# Patient Record
Sex: FEMALE | Race: WHITE | NOT HISPANIC OR LATINO
[De-identification: names, ages, dates, MRNs, and addresses within clinical notes are randomized per-mention and may not be internally consistent; named-entity substitution may affect disease eponyms.]

---

## 2017-08-25 ENCOUNTER — APPOINTMENT (OUTPATIENT)
Dept: MAMMOGRAPHY | Facility: CLINIC | Age: 62
End: 2017-08-25
Payer: COMMERCIAL

## 2017-08-25 ENCOUNTER — OUTPATIENT (OUTPATIENT)
Dept: OUTPATIENT SERVICES | Facility: HOSPITAL | Age: 62
LOS: 1 days | End: 2017-08-25
Payer: COMMERCIAL

## 2017-08-25 DIAGNOSIS — Z00.8 ENCOUNTER FOR OTHER GENERAL EXAMINATION: ICD-10-CM

## 2017-08-25 PROCEDURE — 77067 SCR MAMMO BI INCL CAD: CPT

## 2017-08-25 PROCEDURE — 77063 BREAST TOMOSYNTHESIS BI: CPT | Mod: 26

## 2017-08-25 PROCEDURE — 77063 BREAST TOMOSYNTHESIS BI: CPT

## 2017-08-25 PROCEDURE — G0202: CPT | Mod: 26

## 2018-03-15 ENCOUNTER — APPOINTMENT (OUTPATIENT)
Dept: DERMATOLOGY | Facility: CLINIC | Age: 63
End: 2018-03-15

## 2018-08-28 ENCOUNTER — APPOINTMENT (OUTPATIENT)
Dept: MAMMOGRAPHY | Facility: CLINIC | Age: 63
End: 2018-08-28
Payer: COMMERCIAL

## 2018-08-28 ENCOUNTER — OUTPATIENT (OUTPATIENT)
Dept: OUTPATIENT SERVICES | Facility: HOSPITAL | Age: 63
LOS: 1 days | End: 2018-08-28
Payer: COMMERCIAL

## 2018-08-28 DIAGNOSIS — Z12.31 ENCOUNTER FOR SCREENING MAMMOGRAM FOR MALIGNANT NEOPLASM OF BREAST: ICD-10-CM

## 2018-08-28 PROCEDURE — 77067 SCR MAMMO BI INCL CAD: CPT

## 2018-08-28 PROCEDURE — 77067 SCR MAMMO BI INCL CAD: CPT | Mod: 26

## 2018-08-28 PROCEDURE — 77063 BREAST TOMOSYNTHESIS BI: CPT

## 2018-08-28 PROCEDURE — 77063 BREAST TOMOSYNTHESIS BI: CPT | Mod: 26

## 2018-09-09 ENCOUNTER — TRANSCRIPTION ENCOUNTER (OUTPATIENT)
Age: 63
End: 2018-09-09

## 2019-09-05 ENCOUNTER — APPOINTMENT (OUTPATIENT)
Dept: MAMMOGRAPHY | Facility: CLINIC | Age: 64
End: 2019-09-05
Payer: COMMERCIAL

## 2019-09-05 ENCOUNTER — OUTPATIENT (OUTPATIENT)
Dept: OUTPATIENT SERVICES | Facility: HOSPITAL | Age: 64
LOS: 1 days | End: 2019-09-05
Payer: COMMERCIAL

## 2019-09-05 DIAGNOSIS — Z12.31 ENCOUNTER FOR SCREENING MAMMOGRAM FOR MALIGNANT NEOPLASM OF BREAST: ICD-10-CM

## 2019-09-05 PROCEDURE — 77063 BREAST TOMOSYNTHESIS BI: CPT | Mod: 26

## 2019-09-05 PROCEDURE — 77063 BREAST TOMOSYNTHESIS BI: CPT

## 2019-09-05 PROCEDURE — 77067 SCR MAMMO BI INCL CAD: CPT

## 2019-09-05 PROCEDURE — 77067 SCR MAMMO BI INCL CAD: CPT | Mod: 26

## 2020-04-25 ENCOUNTER — MESSAGE (OUTPATIENT)
Age: 65
End: 2020-04-25

## 2020-05-02 ENCOUNTER — APPOINTMENT (OUTPATIENT)
Dept: DISASTER EMERGENCY | Facility: CLINIC | Age: 65
End: 2020-05-02

## 2020-05-04 LAB
SARS-COV-2 IGG SERPL IA-ACNC: <0.1 INDEX
SARS-COV-2 IGG SERPL QL IA: NEGATIVE

## 2020-09-24 ENCOUNTER — OUTPATIENT (OUTPATIENT)
Dept: OUTPATIENT SERVICES | Facility: HOSPITAL | Age: 65
LOS: 1 days | End: 2020-09-24

## 2020-09-24 DIAGNOSIS — Z12.31 ENCOUNTER FOR SCREENING MAMMOGRAM FOR MALIGNANT NEOPLASM OF BREAST: ICD-10-CM

## 2020-09-24 DIAGNOSIS — Z00.8 ENCOUNTER FOR OTHER GENERAL EXAMINATION: ICD-10-CM

## 2020-10-10 ENCOUNTER — APPOINTMENT (OUTPATIENT)
Dept: MAMMOGRAPHY | Facility: CLINIC | Age: 65
End: 2020-10-10
Payer: COMMERCIAL

## 2020-10-10 ENCOUNTER — OUTPATIENT (OUTPATIENT)
Dept: OUTPATIENT SERVICES | Facility: HOSPITAL | Age: 65
LOS: 1 days | End: 2020-10-10
Payer: COMMERCIAL

## 2020-10-10 DIAGNOSIS — Z12.31 ENCOUNTER FOR SCREENING MAMMOGRAM FOR MALIGNANT NEOPLASM OF BREAST: ICD-10-CM

## 2020-10-10 PROCEDURE — 77063 BREAST TOMOSYNTHESIS BI: CPT

## 2020-10-10 PROCEDURE — 77063 BREAST TOMOSYNTHESIS BI: CPT | Mod: 26

## 2020-10-10 PROCEDURE — 77067 SCR MAMMO BI INCL CAD: CPT

## 2020-10-10 PROCEDURE — 77067 SCR MAMMO BI INCL CAD: CPT | Mod: 26

## 2020-11-05 ENCOUNTER — APPOINTMENT (OUTPATIENT)
Dept: DERMATOLOGY | Facility: CLINIC | Age: 65
End: 2020-11-05
Payer: COMMERCIAL

## 2020-11-05 PROCEDURE — 99203 OFFICE O/P NEW LOW 30 MIN: CPT

## 2020-11-05 PROCEDURE — 99072 ADDL SUPL MATRL&STAF TM PHE: CPT

## 2021-03-16 ENCOUNTER — TRANSCRIPTION ENCOUNTER (OUTPATIENT)
Age: 66
End: 2021-03-16

## 2021-03-19 ENCOUNTER — TRANSCRIPTION ENCOUNTER (OUTPATIENT)
Age: 66
End: 2021-03-19

## 2021-03-22 ENCOUNTER — TRANSCRIPTION ENCOUNTER (OUTPATIENT)
Age: 66
End: 2021-03-22

## 2021-07-23 ENCOUNTER — TRANSCRIPTION ENCOUNTER (OUTPATIENT)
Age: 66
End: 2021-07-23

## 2021-08-13 ENCOUNTER — OUTPATIENT (OUTPATIENT)
Dept: OUTPATIENT SERVICES | Facility: HOSPITAL | Age: 66
LOS: 1 days | End: 2021-08-13
Payer: COMMERCIAL

## 2021-08-13 ENCOUNTER — APPOINTMENT (OUTPATIENT)
Dept: RADIOLOGY | Facility: CLINIC | Age: 66
End: 2021-08-13
Payer: COMMERCIAL

## 2021-08-13 DIAGNOSIS — Z00.8 ENCOUNTER FOR OTHER GENERAL EXAMINATION: ICD-10-CM

## 2021-08-13 PROCEDURE — 71046 X-RAY EXAM CHEST 2 VIEWS: CPT

## 2021-08-13 PROCEDURE — 71046 X-RAY EXAM CHEST 2 VIEWS: CPT | Mod: 26

## 2021-10-01 ENCOUNTER — APPOINTMENT (OUTPATIENT)
Dept: DERMATOLOGY | Facility: CLINIC | Age: 66
End: 2021-10-01
Payer: COMMERCIAL

## 2021-10-01 PROCEDURE — 99214 OFFICE O/P EST MOD 30 MIN: CPT

## 2021-10-05 ENCOUNTER — APPOINTMENT (OUTPATIENT)
Dept: GASTROENTEROLOGY | Facility: CLINIC | Age: 66
End: 2021-10-05
Payer: COMMERCIAL

## 2021-10-05 VITALS — BODY MASS INDEX: 37.56 KG/M2 | WEIGHT: 220 LBS | HEIGHT: 64 IN

## 2021-10-05 LAB
BASOPHILS # BLD AUTO: 0.08 K/UL
BASOPHILS NFR BLD AUTO: 0.8 %
EOSINOPHIL # BLD AUTO: 0.13 K/UL
EOSINOPHIL NFR BLD AUTO: 1.3 %
HCT VFR BLD CALC: 36.7 %
HGB BLD-MCNC: 11.5 G/DL
IMM GRANULOCYTES NFR BLD AUTO: 0.4 %
LYMPHOCYTES # BLD AUTO: 3.11 K/UL
LYMPHOCYTES NFR BLD AUTO: 31.8 %
MAN DIFF?: NORMAL
MCHC RBC-ENTMCNC: 29.9 PG
MCHC RBC-ENTMCNC: 31.3 GM/DL
MCV RBC AUTO: 95.6 FL
MONOCYTES # BLD AUTO: 0.56 K/UL
MONOCYTES NFR BLD AUTO: 5.7 %
NEUTROPHILS # BLD AUTO: 5.86 K/UL
NEUTROPHILS NFR BLD AUTO: 60 %
PLATELET # BLD AUTO: 430 K/UL
RBC # BLD: 3.84 M/UL
RBC # FLD: 16.5 %
WBC # FLD AUTO: 9.78 K/UL

## 2021-10-05 PROCEDURE — 99203 OFFICE O/P NEW LOW 30 MIN: CPT

## 2021-10-05 NOTE — HISTORY OF PRESENT ILLNESS
[de-identified] : Ms. AIDA ARORA is a 66 year old female with history of anemia. Patient had routine labs which she was noted to be anemic. Patient has no complaints of abdominal pain, rectal bleeding or heartburn symptoms. Patient had last colonoscopy over fifteen years ago. Patient's father and sister both had colon cancer.\par

## 2021-10-05 NOTE — ASSESSMENT
[FreeTextEntry1] : 65 yo female with history of anemia and family history of colon cancer. Will obtain iron/B12 studies, and arrange for colonoscopy and upper endoscopy.

## 2021-10-06 LAB
FERRITIN SERPL-MCNC: 311 NG/ML
FOLATE SERPL-MCNC: >20 NG/ML
IRON SATN MFR SERPL: 19 %
IRON SERPL-MCNC: 66 UG/DL
TIBC SERPL-MCNC: 347 UG/DL
UIBC SERPL-MCNC: 281 UG/DL
VIT B12 SERPL-MCNC: 778 PG/ML

## 2021-10-16 ENCOUNTER — OUTPATIENT (OUTPATIENT)
Dept: OUTPATIENT SERVICES | Facility: HOSPITAL | Age: 66
LOS: 1 days | End: 2021-10-16
Payer: COMMERCIAL

## 2021-10-16 ENCOUNTER — APPOINTMENT (OUTPATIENT)
Dept: MAMMOGRAPHY | Facility: CLINIC | Age: 66
End: 2021-10-16
Payer: COMMERCIAL

## 2021-10-16 DIAGNOSIS — Z00.8 ENCOUNTER FOR OTHER GENERAL EXAMINATION: ICD-10-CM

## 2021-10-16 DIAGNOSIS — Z12.31 ENCOUNTER FOR SCREENING MAMMOGRAM FOR MALIGNANT NEOPLASM OF BREAST: ICD-10-CM

## 2021-10-16 PROCEDURE — 77063 BREAST TOMOSYNTHESIS BI: CPT

## 2021-10-16 PROCEDURE — 77067 SCR MAMMO BI INCL CAD: CPT | Mod: 26

## 2021-10-16 PROCEDURE — 77067 SCR MAMMO BI INCL CAD: CPT

## 2021-10-16 PROCEDURE — 77063 BREAST TOMOSYNTHESIS BI: CPT | Mod: 26

## 2021-10-18 ENCOUNTER — OUTPATIENT (OUTPATIENT)
Dept: OUTPATIENT SERVICES | Facility: HOSPITAL | Age: 66
LOS: 1 days | End: 2021-10-18
Payer: COMMERCIAL

## 2021-10-18 ENCOUNTER — APPOINTMENT (OUTPATIENT)
Dept: CT IMAGING | Facility: CLINIC | Age: 66
End: 2021-10-18
Payer: COMMERCIAL

## 2021-10-18 DIAGNOSIS — J01.11 ACUTE RECURRENT FRONTAL SINUSITIS: ICD-10-CM

## 2021-10-18 PROCEDURE — 70486 CT MAXILLOFACIAL W/O DYE: CPT | Mod: 26

## 2021-10-18 PROCEDURE — 70486 CT MAXILLOFACIAL W/O DYE: CPT

## 2021-10-20 ENCOUNTER — APPOINTMENT (OUTPATIENT)
Dept: CT IMAGING | Facility: CLINIC | Age: 66
End: 2021-10-20

## 2021-10-20 ENCOUNTER — TRANSCRIPTION ENCOUNTER (OUTPATIENT)
Age: 66
End: 2021-10-20

## 2021-12-07 ENCOUNTER — APPOINTMENT (OUTPATIENT)
Dept: GASTROENTEROLOGY | Facility: AMBULATORY MEDICAL SERVICES | Age: 66
End: 2021-12-07
Payer: COMMERCIAL

## 2021-12-07 ENCOUNTER — RESULT REVIEW (OUTPATIENT)
Age: 66
End: 2021-12-07

## 2021-12-07 PROCEDURE — 45385 COLONOSCOPY W/LESION REMOVAL: CPT

## 2021-12-07 PROCEDURE — 43239 EGD BIOPSY SINGLE/MULTIPLE: CPT

## 2021-12-07 PROCEDURE — 45380 COLONOSCOPY AND BIOPSY: CPT | Mod: 59

## 2022-10-06 ENCOUNTER — APPOINTMENT (OUTPATIENT)
Dept: DERMATOLOGY | Facility: CLINIC | Age: 67
End: 2022-10-06

## 2022-10-06 ENCOUNTER — RESULT REVIEW (OUTPATIENT)
Age: 67
End: 2022-10-06

## 2022-10-06 PROCEDURE — 11102 TANGNTL BX SKIN SINGLE LES: CPT

## 2022-10-06 PROCEDURE — 99213 OFFICE O/P EST LOW 20 MIN: CPT | Mod: 25

## 2022-10-17 ENCOUNTER — APPOINTMENT (OUTPATIENT)
Dept: MAMMOGRAPHY | Facility: CLINIC | Age: 67
End: 2022-10-17

## 2022-10-17 ENCOUNTER — OUTPATIENT (OUTPATIENT)
Dept: OUTPATIENT SERVICES | Facility: HOSPITAL | Age: 67
LOS: 1 days | End: 2022-10-17
Payer: MEDICARE

## 2022-10-17 DIAGNOSIS — Z12.31 ENCOUNTER FOR SCREENING MAMMOGRAM FOR MALIGNANT NEOPLASM OF BREAST: ICD-10-CM

## 2022-10-17 DIAGNOSIS — Z00.8 ENCOUNTER FOR OTHER GENERAL EXAMINATION: ICD-10-CM

## 2022-10-17 PROCEDURE — 77067 SCR MAMMO BI INCL CAD: CPT | Mod: 26

## 2022-10-17 PROCEDURE — 77067 SCR MAMMO BI INCL CAD: CPT

## 2022-10-17 PROCEDURE — 77063 BREAST TOMOSYNTHESIS BI: CPT

## 2022-10-17 PROCEDURE — 77063 BREAST TOMOSYNTHESIS BI: CPT | Mod: 26

## 2022-12-23 ENCOUNTER — NON-APPOINTMENT (OUTPATIENT)
Age: 67
End: 2022-12-23

## 2022-12-23 ENCOUNTER — APPOINTMENT (OUTPATIENT)
Dept: CARDIOLOGY | Facility: CLINIC | Age: 67
End: 2022-12-23

## 2022-12-23 VITALS
SYSTOLIC BLOOD PRESSURE: 146 MMHG | HEART RATE: 104 BPM | DIASTOLIC BLOOD PRESSURE: 83 MMHG | RESPIRATION RATE: 15 BRPM | OXYGEN SATURATION: 95 % | BODY MASS INDEX: 37.73 KG/M2 | WEIGHT: 221 LBS | HEIGHT: 64 IN

## 2022-12-23 DIAGNOSIS — Z82.49 FAMILY HISTORY OF ISCHEMIC HEART DISEASE AND OTHER DISEASES OF THE CIRCULATORY SYSTEM: ICD-10-CM

## 2022-12-23 DIAGNOSIS — R06.09 OTHER FORMS OF DYSPNEA: ICD-10-CM

## 2022-12-23 PROCEDURE — 99205 OFFICE O/P NEW HI 60 MIN: CPT

## 2022-12-23 PROCEDURE — 93000 ELECTROCARDIOGRAM COMPLETE: CPT

## 2022-12-23 RX ORDER — SODIUM SULFATE, POTASSIUM SULFATE, MAGNESIUM SULFATE 17.5; 3.13; 1.6 G/ML; G/ML; G/ML
17.5-3.13-1.6 SOLUTION, CONCENTRATE ORAL
Qty: 2 | Refills: 0 | Status: DISCONTINUED | COMMUNITY
Start: 2021-10-05 | End: 2022-12-23

## 2022-12-23 RX ORDER — PSYLLIUM HUSK 0.4 G
CAPSULE ORAL
Refills: 0 | Status: ACTIVE | COMMUNITY

## 2022-12-23 RX ORDER — MONTELUKAST SODIUM 10 MG/1
10 TABLET, FILM COATED ORAL DAILY
Refills: 0 | Status: ACTIVE | COMMUNITY

## 2022-12-23 RX ORDER — CHROMIUM 200 MCG
TABLET ORAL
Refills: 0 | Status: ACTIVE | COMMUNITY

## 2022-12-23 NOTE — PHYSICAL EXAM
[Normal] : clear lung fields, good air entry, no respiratory distress [Soft] : abdomen soft [Non Tender] : non-tender [Moves all extremities] : moves all extremities [Alert and Oriented] : alert and oriented [de-identified] : 1+ pitting edema LE bilaterally, stasis changes right greater than left, varicose veins noted

## 2022-12-23 NOTE — DISCUSSION/SUMMARY
[EKG obtained to assist in diagnosis and management of assessed problem(s)] : EKG obtained to assist in diagnosis and management of assessed problem(s) [FreeTextEntry1] : Patient is a 68yo F with DM, HTN, family history of CAD here for cardiac evaluation of palpitations. \par -Mildly tachy and hypertensive today, possible from anxiety although states baseline HR higher more recently. Could be sign of deconditioning or CV disease\par -ECG mildly abnormal with diffuse NSST\par -Palps may represent ectopy, will need monitoring\par -NEeds further CV work up given symptoms, ECG, risk factors\par \par 1. 2 week event to eval palps\par 2. Echo to eval palps/dyspnea/edema/ECG\par 3. 2 day pharm nuclear stress to evaluate ECG/palps/dyspnea, unable to run on treadmill\par 4. Venous duplex to eval edema\par 5. Obtain prior records, patient states she will bring in \par 6. Lipids controlled, will consider statin at follow up given increased CV risk/DM\par 7. Recommend aggressive diet and lifestyle modifications \par 8. Follow up after testing \par 9. REgular PMD follow up \par 10. Diabetes management per PMD. Counselled on diet/weight loss , continue current meds\par 11. Signs CVI on exam, plan for venous duplex. Recommend restart using compression stockings and leg elevation

## 2022-12-23 NOTE — HISTORY OF PRESENT ILLNESS
[FreeTextEntry1] : Patient is a 68yo F with DM, HTN, former smoker, family history of CAD here for cardiac evaluation of palpitations. Symptoms not exertional and mostly occur at rest. Walks regularly and feels well with walking, goes up to 150s during activity. NOting HR a bit faster at rest recently, formerly in 80s and now in 90s. Palps happen daily. FEels it in her throat, feels like skipped beat and not sustained. States put on monitor from her office and states had APCs only. Denies CP, gets dyspneic up 2 flights of stairs fairly chronically. No PND/orthopnea/syncope. Occasional right ankle swelling from OA and prior injury years back. Sleeps well and flat, well rested during the day. 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 \par \par ROS: GI negative, all others negative

## 2023-01-23 ENCOUNTER — APPOINTMENT (OUTPATIENT)
Dept: CARDIOLOGY | Facility: CLINIC | Age: 68
End: 2023-01-23
Payer: MEDICARE

## 2023-01-23 PROCEDURE — 93970 EXTREMITY STUDY: CPT

## 2023-01-23 PROCEDURE — 93306 TTE W/DOPPLER COMPLETE: CPT

## 2023-01-31 ENCOUNTER — MED ADMIN CHARGE (OUTPATIENT)
Age: 68
End: 2023-01-31

## 2023-01-31 ENCOUNTER — APPOINTMENT (OUTPATIENT)
Dept: CARDIOLOGY | Facility: CLINIC | Age: 68
End: 2023-01-31
Payer: MEDICARE

## 2023-01-31 PROCEDURE — 78452 HT MUSCLE IMAGE SPECT MULT: CPT

## 2023-01-31 PROCEDURE — 93015 CV STRESS TEST SUPVJ I&R: CPT

## 2023-01-31 PROCEDURE — A9500: CPT

## 2023-01-31 RX ORDER — REGADENOSON 0.08 MG/ML
0.4 INJECTION, SOLUTION INTRAVENOUS
Qty: 4 | Refills: 0 | Status: COMPLETED | OUTPATIENT
Start: 2023-01-31

## 2023-01-31 RX ADMIN — REGADENOSON 5 MG/5ML: 0.08 INJECTION, SOLUTION INTRAVENOUS at 00:00

## 2023-02-01 ENCOUNTER — APPOINTMENT (OUTPATIENT)
Dept: CARDIOLOGY | Facility: CLINIC | Age: 68
End: 2023-02-01

## 2023-02-07 ENCOUNTER — APPOINTMENT (OUTPATIENT)
Dept: CARDIOLOGY | Facility: CLINIC | Age: 68
End: 2023-02-07
Payer: MEDICARE

## 2023-02-07 VITALS
OXYGEN SATURATION: 98 % | HEIGHT: 64 IN | BODY MASS INDEX: 37.73 KG/M2 | RESPIRATION RATE: 15 BRPM | WEIGHT: 221 LBS | SYSTOLIC BLOOD PRESSURE: 140 MMHG | DIASTOLIC BLOOD PRESSURE: 82 MMHG | HEART RATE: 95 BPM

## 2023-02-07 DIAGNOSIS — R60.0 LOCALIZED EDEMA: ICD-10-CM

## 2023-02-07 DIAGNOSIS — R94.31 ABNORMAL ELECTROCARDIOGRAM [ECG] [EKG]: ICD-10-CM

## 2023-02-07 PROCEDURE — 99214 OFFICE O/P EST MOD 30 MIN: CPT

## 2023-02-07 NOTE — ASSESSMENT
[FreeTextEntry1] : ECG: SR, NSST \par \par \par A1C 6.9 (10/2022) \par  HDL 53   (10/2022) \par \par PHARM NUCLEAR STRESS 2/2023:\par 1. Negative ischemia/infarct, EF 75%\par 2. BP hypertensive baseline (146/72), normal response\par \par \par LE VENOUS DUPLEX 1/2023:\par 1. Negative for DVT\par \par EVENT MONITOR 1/2023:\par 1. SR, no events, rare ectopy\par \par ECHO 1/2023:\par 1. Normal LV size, systolic and diastolic function. EF 60-65%\par 2. Normal RV/LA/RA \par 3. Trivial MR\par 4. Mild TR, RVSP 38mmHg

## 2023-02-07 NOTE — PHYSICAL EXAM
[Normal] : clear lung fields, good air entry, no respiratory distress [Soft] : abdomen soft [Non Tender] : non-tender [Moves all extremities] : moves all extremities [Alert and Oriented] : alert and oriented [de-identified] : 1+ pitting edema LE bilaterally, stasis changes right greater than left, varicose veins noted

## 2023-02-07 NOTE — DISCUSSION/SUMMARY
[FreeTextEntry1] : Patient is a 66yo F with DM, HTN, family history of CAD here for cardiac evaluation of palpitations. \par -ECG mildly abnormal with diffuse NSST\par -Event monitor 1/2023 unremarkable\par -Echo 1/2023 with normal BiV function, borderline increase RVSP not clinically significant\par -LE VENOUS duplex 1/2023 without DVT\par -Pharm nuclear stress 2/2023 negative\par \par 1. Add Nebivolol 2.5mg daily for palps/HTN\par 2. Increase physical activity as tolerated \par 3. Recommend aggressive diet and lifestyle modifications \par 4. Lipids controlled, will start atorvastatin 10mg qhs given increased CV risk/DM\par 5.  Recommend restart using compression stockings and leg elevation for CVI\par 6. Follow up 3 months\par 7. Will start above meds [facility]

## 2023-02-07 NOTE — HISTORY OF PRESENT ILLNESS
[FreeTextEntry1] : Patient is a 66yo F with DM, HTN, former smoker, family history of CAD here for cardiac follow up of palpitations. Symptoms not exertional and mostly occur at rest. Walks regularly and feels well with walking, goes up to 150s during activity. NOting HR a bit faster at rest recently, formerly in 80s and now in 90s. Palps happen daily. FEels it in her throat, feels like skipped beat and not sustained. States put on monitor from her office and states had APCs only. Denies CP, gets dyspneic up 2 flights of stairs fairly chronically. No PND/orthopnea/syncope. Occasional right ankle swelling from OA and prior injury years back. Sleeps well and flat, well rested during the day. \par \par Due to above symptoms underwent cardiac work up and testing. Symptoms better when stopped caffeine, then came back a bit feeling palps in throat with more caffeine. 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 \par \par ROS: GI negative, all others negative

## 2023-03-21 ENCOUNTER — NON-APPOINTMENT (OUTPATIENT)
Age: 68
End: 2023-03-21

## 2023-03-22 RX ORDER — ATORVASTATIN CALCIUM 10 MG/1
10 TABLET, FILM COATED ORAL
Qty: 90 | Refills: 2 | Status: DISCONTINUED | COMMUNITY
Start: 2023-02-07 | End: 2023-03-22

## 2023-03-23 RX ORDER — KIT FOR THE PREPARATION OF TECHNETIUM TC99M SESTAMIBI 1 MG/5ML
INJECTION, POWDER, LYOPHILIZED, FOR SOLUTION PARENTERAL
Refills: 0 | Status: COMPLETED | OUTPATIENT
Start: 2023-03-23

## 2023-03-23 RX ADMIN — KIT FOR THE PREPARATION OF TECHNETIUM TC99M SESTAMIBI 0: 1 INJECTION, POWDER, LYOPHILIZED, FOR SOLUTION PARENTERAL at 00:00

## 2023-05-08 ENCOUNTER — APPOINTMENT (OUTPATIENT)
Dept: CARDIOLOGY | Facility: CLINIC | Age: 68
End: 2023-05-08
Payer: MEDICARE

## 2023-05-08 ENCOUNTER — NON-APPOINTMENT (OUTPATIENT)
Age: 68
End: 2023-05-08

## 2023-05-08 VITALS
HEART RATE: 87 BPM | RESPIRATION RATE: 15 BRPM | DIASTOLIC BLOOD PRESSURE: 70 MMHG | SYSTOLIC BLOOD PRESSURE: 120 MMHG | HEIGHT: 64 IN | BODY MASS INDEX: 37.56 KG/M2 | WEIGHT: 220 LBS | OXYGEN SATURATION: 96 %

## 2023-05-08 PROCEDURE — 93000 ELECTROCARDIOGRAM COMPLETE: CPT

## 2023-05-08 PROCEDURE — 99214 OFFICE O/P EST MOD 30 MIN: CPT

## 2023-05-08 RX ORDER — FLUTICASONE PROPIONATE 50 UG/1
50 SPRAY, METERED NASAL
Qty: 16 | Refills: 0 | Status: ACTIVE | COMMUNITY
Start: 2023-04-26

## 2023-05-08 NOTE — DISCUSSION/SUMMARY
[EKG obtained to assist in diagnosis and management of assessed problem(s)] : EKG obtained to assist in diagnosis and management of assessed problem(s) [FreeTextEntry1] : Patient is a 67yo F with DM, HTN, family history of CAD here for cardiac evaluation of palpitations. \par -ECG unremarkable, minor NSST only , BP better controlled on current regimen\par -Event monitor 1/2023 unremarkable\par -Echo 1/2023 with normal BiV function, borderline increase RVSP not clinically significant\par -LE VENOUS duplex 1/2023 without DVT\par -Pharm nuclear stress 2/2023 negative\par \par 1. CV stable, continue aggressive risk factor modification \par 2. Increase physical activity as tolerated \par 3. Recommend aggressive diet and lifestyle modifications \par 4. BP better controlled now, continue current meds\par 5.  Recommend restart using compression stockings and leg elevation for CVI\par 6. Diabetes management per PMD. Counselled on diet/weight loss \par 7. Annual follow up \par 8. Ortho evaluation for left hip pain\par 9. Continue statin, has some constipation she thinks related. Takes 3x per week, advised daily if can tolerate

## 2023-05-08 NOTE — PHYSICAL EXAM
[Normal] : clear lung fields, good air entry, no respiratory distress [Soft] : abdomen soft [Non Tender] : non-tender [Moves all extremities] : moves all extremities [Alert and Oriented] : alert and oriented [de-identified] : 1+ pitting edema LE bilaterally, stasis changes right greater than left, varicose veins noted

## 2023-05-08 NOTE — HISTORY OF PRESENT ILLNESS
[FreeTextEntry1] : Patient is a 69yo F with DM, HTN, former smoker, family history of CAD here for cardiac follow up. Due to increased palps this past winter underwent cardiac work up and testing this past winter that was negative. . Symptoms better when stopped caffeine, then came back a bit feeling palps in throat with more caffeine. \par \par Started on Nebivilol for her BP after last OV. Also put on statin. Still will feel occasional palpitatoin, doesn’t drink much fluid with helps HR. Otherwise feels well and no new complaints. Still golfs. Was out in garden very active this past weekend.  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 \par \par ROS: GI negative, all others negative

## 2023-05-08 NOTE — ASSESSMENT
[FreeTextEntry1] : ECG: SR, no significant ST-T abnormalities and normal intervals \par \par \par A1C 6.9 (10/2022) \par  HDL 53   (10/2022) \par \par PHARM NUCLEAR STRESS 2/2023:\par 1. Negative ischemia/infarct, EF 75%\par 2. BP hypertensive baseline (146/72), normal response\par \par \par LE VENOUS DUPLEX 1/2023:\par 1. Negative for DVT\par \par EVENT MONITOR 1/2023:\par 1. SR, no events, rare ectopy\par \par ECHO 1/2023:\par 1. Normal LV size, systolic and diastolic function. EF 60-65%\par 2. Normal RV/LA/RA \par 3. Trivial MR\par 4. Mild TR, RVSP 38mmHg

## 2023-07-21 ENCOUNTER — RX RENEWAL (OUTPATIENT)
Age: 68
End: 2023-07-21

## 2023-08-30 DIAGNOSIS — M25.552 PAIN IN LEFT HIP: ICD-10-CM

## 2023-08-31 ENCOUNTER — APPOINTMENT (OUTPATIENT)
Dept: ORTHOPEDIC SURGERY | Facility: CLINIC | Age: 68
End: 2023-08-31
Payer: MEDICARE

## 2023-08-31 VITALS
DIASTOLIC BLOOD PRESSURE: 72 MMHG | HEART RATE: 94 BPM | HEIGHT: 64 IN | BODY MASS INDEX: 36.02 KG/M2 | SYSTOLIC BLOOD PRESSURE: 111 MMHG | WEIGHT: 211 LBS

## 2023-08-31 PROCEDURE — 99204 OFFICE O/P NEW MOD 45 MIN: CPT

## 2023-08-31 PROCEDURE — 73502 X-RAY EXAM HIP UNI 2-3 VIEWS: CPT

## 2023-08-31 NOTE — PHYSICAL EXAM
[de-identified] : The patient appears well nourished and in no apparent distress.  The patient is alert and oriented to person, place, and time.   Affect and mood appear normal. The head is normocephalic and atraumatic.  The eyes reveal normal sclera and extra ocular muscles are intact. The tongue is midline with no apparent lesions.  Skin shows normal turgor with no evidence of eczema or psoriasis.  No respiratory distress noted.  Sensation grossly intact.		  [de-identified] : Exam of the left hip shows pain free range of motion and mild tenderness over the GT bursa.  5/5 motor strength bilaterally distally. Sensation intact distally.		  [de-identified] : X-ray: AP view of the pelvis and 2 views of the left hip demonstrate a well preserved left hip joint space.

## 2023-08-31 NOTE — HISTORY OF PRESENT ILLNESS
[Pain Location] : pain [de-identified] : Patient is a 68 year old female who presents c/o left hip pain for several months.  Patient states pain started after doing squats.  Patient states feels pain when sitting or going upstairs.  Patient locates pain to lateral hip.  Patient states can walk without difficulty.  Patient uses tylenol and advil with relief.

## 2023-08-31 NOTE — ADDENDUM
[FreeTextEntry1] : This note was authored by Stepan Sawant working as a medical scribe for Dr. Jitendra Hernandez. The note was reviewed, edited, and revised by Dr. Jitendra Hernandez whom is in agreement with the exam findings, imaging findings, and treatment plan. 08/31/2023

## 2023-08-31 NOTE — CONSULT LETTER
[Dear  ___] : Dear  [unfilled], [Consult Letter:] : I had the pleasure of evaluating your patient, [unfilled]. [Please see my note below.] : Please see my note below. [Consult Closing:] : Thank you very much for allowing me to participate in the care of this patient.  If you have any questions, please do not hesitate to contact me. [Sincerely,] : Sincerely, [FreeTextEntry3] : Jitendra Hernandez MD Chief of Joint Replacement Primary & Revision Hip and Knee Replacement  [facility] [place]

## 2023-08-31 NOTE — DISCUSSION/SUMMARY
[de-identified] : AIDA ARORA is a 68-year-old female who presents with left hip pain and mild GT bursitis, but mostly her symptoms are consistent with sciatica and low back pain. Her x-ray reveals a well-preserved left hip joint space. A prescription for physical therapy was provided. Mobic was discussed but the patient declined a prescription and will instead continue to take OTC NSAIDs as needed. Most of her pain is likely related to her spine. As such, the patient was referred to physiatry for further evaluation of her pain.

## 2023-09-14 ENCOUNTER — RX RENEWAL (OUTPATIENT)
Age: 68
End: 2023-09-14

## 2023-10-10 ENCOUNTER — APPOINTMENT (OUTPATIENT)
Dept: DERMATOLOGY | Facility: CLINIC | Age: 68
End: 2023-10-10
Payer: MEDICARE

## 2023-10-10 PROCEDURE — 99214 OFFICE O/P EST MOD 30 MIN: CPT

## 2023-10-17 ENCOUNTER — APPOINTMENT (OUTPATIENT)
Dept: MAMMOGRAPHY | Facility: CLINIC | Age: 68
End: 2023-10-17
Payer: MEDICARE

## 2023-10-17 ENCOUNTER — OUTPATIENT (OUTPATIENT)
Dept: OUTPATIENT SERVICES | Facility: HOSPITAL | Age: 68
LOS: 1 days | End: 2023-10-17
Payer: MEDICARE

## 2023-10-17 DIAGNOSIS — Z12.31 ENCOUNTER FOR SCREENING MAMMOGRAM FOR MALIGNANT NEOPLASM OF BREAST: ICD-10-CM

## 2023-10-17 PROCEDURE — 77063 BREAST TOMOSYNTHESIS BI: CPT | Mod: 26

## 2023-10-17 PROCEDURE — 77067 SCR MAMMO BI INCL CAD: CPT

## 2023-10-17 PROCEDURE — 77063 BREAST TOMOSYNTHESIS BI: CPT

## 2023-10-17 PROCEDURE — 77067 SCR MAMMO BI INCL CAD: CPT | Mod: 26

## 2023-11-02 ENCOUNTER — APPOINTMENT (OUTPATIENT)
Dept: DERMATOLOGY | Facility: CLINIC | Age: 68
End: 2023-11-02
Payer: MEDICARE

## 2023-11-02 PROCEDURE — 99214 OFFICE O/P EST MOD 30 MIN: CPT

## 2023-12-13 ENCOUNTER — APPOINTMENT (OUTPATIENT)
Dept: INTERNAL MEDICINE | Facility: CLINIC | Age: 68
End: 2023-12-13

## 2023-12-13 ENCOUNTER — APPOINTMENT (OUTPATIENT)
Dept: INTERNAL MEDICINE | Facility: CLINIC | Age: 68
End: 2023-12-13
Payer: MEDICARE

## 2023-12-13 VITALS
HEIGHT: 64 IN | TEMPERATURE: 98 F | BODY MASS INDEX: 35.17 KG/M2 | RESPIRATION RATE: 16 BRPM | SYSTOLIC BLOOD PRESSURE: 127 MMHG | WEIGHT: 206 LBS | DIASTOLIC BLOOD PRESSURE: 73 MMHG

## 2023-12-13 VITALS — HEART RATE: 72 BPM

## 2023-12-13 DIAGNOSIS — M70.62 TROCHANTERIC BURSITIS, LEFT HIP: ICD-10-CM

## 2023-12-13 DIAGNOSIS — Z87.891 PERSONAL HISTORY OF NICOTINE DEPENDENCE: ICD-10-CM

## 2023-12-13 DIAGNOSIS — Z81.8 FAMILY HISTORY OF OTHER MENTAL AND BEHAVIORAL DISORDERS: ICD-10-CM

## 2023-12-13 DIAGNOSIS — Z80.0 FAMILY HISTORY OF MALIGNANT NEOPLASM OF DIGESTIVE ORGANS: ICD-10-CM

## 2023-12-13 DIAGNOSIS — M19.079 PRIMARY OSTEOARTHRITIS, UNSPECIFIED ANKLE AND FOOT: ICD-10-CM

## 2023-12-13 DIAGNOSIS — R74.8 ABNORMAL LEVELS OF OTHER SERUM ENZYMES: ICD-10-CM

## 2023-12-13 PROCEDURE — 99204 OFFICE O/P NEW MOD 45 MIN: CPT

## 2023-12-13 RX ORDER — PIOGLITAZONE 15 MG/1
15 TABLET ORAL TWICE DAILY
Refills: 0 | Status: COMPLETED | COMMUNITY
End: 2023-12-13

## 2023-12-13 RX ORDER — METFORMIN HYDROCHLORIDE 850 MG/1
850 TABLET, COATED ORAL TWICE DAILY
Refills: 0 | Status: COMPLETED | COMMUNITY
End: 2023-12-13

## 2023-12-13 NOTE — HISTORY OF PRESENT ILLNESS
[FreeTextEntry1] : establish care [de-identified] : AIDA ARORA is a 68 year old female with PMHx anemia, DM, HTN presenting to [facility] care.   DM on metformin 1000mg BID, pioglitazone 30 once dailly. states she is on rybelsus 7mg for 1 year. states he DM is well controlled-  last a1c was 6.1 last year. however she states she has been having to pay out of pocket for the rybelsus and has been expensive. she is wondering if she can be switched over to something else.   HTN irbesartan 300mg daily  hip pain: diagnosed with hip bursitis which she has been getting PT for. states that injection didnt help.  she is following with ortho Dr Kalia Estrella.   states she had neg cardiac workup including echo and stress testing and holter in january. last saw her cardiologist Dr Valerio in may 2023. states she was seeing him for palpitations which have since rsolved. she states she gets only very seldomly now. states she had leg cramps with nebivolol. she states her HR at home is around 80-90s. she does not wish to go back on the nebivolol and stopped taking on her own.   HLD rosuvastatin 10, fish oil.  she takes Tylenol daily for foot OA. 500mg daily.   nasal polyp: on singular 10 every other day and flonase prn  has had cough for several years, productive cough. former smoker. quit about 14 years ago- 30 pack year smoker.   takes vitamin d.

## 2023-12-13 NOTE — HEALTH RISK ASSESSMENT
[MammogramDate] : 10/2023 [PapSmearDate] : 01/2020 [BoneDensityDate] : 01/2021 [ColonoscopyDate] : 12/2021 [ColonoscopyComments] : GI Dr Audi Williamson. hemorrhoids, polyps. 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 used to smoke a pack a day for 30 years.

## 2023-12-13 NOTE — ASSESSMENT
[FreeTextEntry1] : Physical Exam: Constitutional: No acute distress, well appearing HEENT: Normocephalic, atraumatic Neck: supple Cardiac:  Regular rate and rhythm, No murmurs Pulmonary: No respiratory distress, Lungs clear to auscultation bilaterally, no wheezing, rales, or rhonchi Abdomen: Soft, non-tender, non-distended, no guarding, normal bowel sounds Vascular: No peripheral edema Neurology: Coordination grossly intact, no focal deficits Psychiatric: Alert and oriented x3, normal mood   A/P: HCM: - she will do fasting bloodwork at the lab - flu- declined - TDAP- declined - pneumonia vaccine- declined - shingles vaccine- declined.  - Colon CA screening- due, she will see her GI for colonoscopy - Breast CA screening- UTD - cervical CA screening- due, referred to gyn - DEXA screening- UTD - states she had elevated folate and b12 on her last test with her pcp- will check levels  anemia states she has hx iron def anemia from fibroids states she has had fibroids removed cbc reviewed on file with GI from 2021 which was normal - will check cbc  DM;  on metformin 1000mg BID, pioglitazone 30 once dailly. states she is on rybelsus 7mg for 1 year states that her last a1c was 6.1 last year.  however she states she has been having to pay out of pocket for the rybelsus and has been expensive. she is wondering if she can be switched over to something else. we discussed potentially doing ozempic, wegovy or mounjaro. she will ask her insurance and let us know - will check a1c   HTN  bp controlled - c/w irbesartan 300mg daily  hip pain:  diagnosed with hip bursitis which she has been getting PT for. states that injection didnt help - c/w PT  - f/u ortho  palpitations states tehy have resolved she had stopped nebivolol on her own HR wnl today  HLD  on rosuvastatin 10, fish oil - will check fasting lipids  foot OA - rec she avoid taking tylenol daily - rec f/u podiatry, she has been seeing  nasal polyp:  on singular 10 every other day and flonase prn  chronic cough former long time smoker - rec CT chest for lung CA screening - also rec she f/u with pulm for PFT/ further evaluation

## 2023-12-14 ENCOUNTER — NON-APPOINTMENT (OUTPATIENT)
Age: 68
End: 2023-12-14

## 2023-12-15 VITALS — WEIGHT: 206 LBS | HEIGHT: 64 IN | BODY MASS INDEX: 35.17 KG/M2

## 2023-12-15 DIAGNOSIS — Z87.891 PERSONAL HISTORY OF NICOTINE DEPENDENCE: ICD-10-CM

## 2023-12-15 NOTE — HISTORY OF PRESENT ILLNESS
[Former] : Former [TextBox_13] : Referred by Dr. Jessica Alcantar  Ms. AIDA ARORA  is a 68 year old woman with a history of nicotine dependence.  She  was seen in the office by Dr. Alcantar for review of eligibility for, as well as, discussion of Low-Dose CT lung cancer screening program. Over the telephone today we reviewed and confirmed that the patient meets screening eligibility criteria: -Age: 68 year  -Smoking status: -Former smoker -Number of pack(s) per day: 1 -Number of years smoked: 30 -Number of pack years smokin -Number of years since quitting smoking: 15 -Quit year:   Ms. ARORA denies any signs or symptoms of lung cancer including new cough, change in cough, hemoptysis and unintentional weight loss.   Ms. ARORA denies any personal history of lung cancer. No lung cancer in a 1st degree relative.   [YearQuit] : 2008 [PacksperYear] : 30

## 2024-01-03 ENCOUNTER — LABORATORY RESULT (OUTPATIENT)
Age: 69
End: 2024-01-03

## 2024-01-04 LAB
25(OH)D3 SERPL-MCNC: 34.8 NG/ML
ALBUMIN SERPL ELPH-MCNC: 4 G/DL
ALP BLD-CCNC: 64 U/L
ALT SERPL-CCNC: 14 U/L
ANION GAP SERPL CALC-SCNC: 10 MMOL/L
APPEARANCE: CLEAR
AST SERPL-CCNC: 10 U/L
BASOPHILS # BLD AUTO: 0.08 K/UL
BASOPHILS NFR BLD AUTO: 0.9 %
BILIRUB SERPL-MCNC: 0.3 MG/DL
BILIRUBIN URINE: NEGATIVE
BLOOD URINE: NEGATIVE
BUN SERPL-MCNC: 21 MG/DL
CALCIUM SERPL-MCNC: 9.2 MG/DL
CHLORIDE SERPL-SCNC: 105 MMOL/L
CHOLEST SERPL-MCNC: 139 MG/DL
CO2 SERPL-SCNC: 27 MMOL/L
COLOR: YELLOW
CREAT SERPL-MCNC: 0.72 MG/DL
EGFR: 91 ML/MIN/1.73M2
EOSINOPHIL # BLD AUTO: 0.15 K/UL
EOSINOPHIL NFR BLD AUTO: 1.8 %
ESTIMATED AVERAGE GLUCOSE: 143 MG/DL
FOLATE SERPL-MCNC: 17 NG/ML
GLUCOSE QUALITATIVE U: NEGATIVE MG/DL
GLUCOSE SERPL-MCNC: 113 MG/DL
HBA1C MFR BLD HPLC: 6.6 %
HCT VFR BLD CALC: 35 %
HDLC SERPL-MCNC: 64 MG/DL
HGB BLD-MCNC: 10.4 G/DL
IMM GRANULOCYTES NFR BLD AUTO: 0.4 %
KETONES URINE: NEGATIVE MG/DL
LDLC SERPL CALC-MCNC: 63 MG/DL
LEUKOCYTE ESTERASE URINE: NEGATIVE
LYMPHOCYTES # BLD AUTO: 1.86 K/UL
LYMPHOCYTES NFR BLD AUTO: 21.7 %
MAN DIFF?: NORMAL
MCHC RBC-ENTMCNC: 28.6 PG
MCHC RBC-ENTMCNC: 29.7 GM/DL
MCV RBC AUTO: 96.2 FL
MONOCYTES # BLD AUTO: 0.74 K/UL
MONOCYTES NFR BLD AUTO: 8.6 %
NEUTROPHILS # BLD AUTO: 5.71 K/UL
NEUTROPHILS NFR BLD AUTO: 66.6 %
NITRITE URINE: NEGATIVE
NONHDLC SERPL-MCNC: 74 MG/DL
PH URINE: 6.5
PLATELET # BLD AUTO: 455 K/UL
POTASSIUM SERPL-SCNC: 4.4 MMOL/L
PROT SERPL-MCNC: 6.3 G/DL
PROTEIN URINE: 30 MG/DL
RBC # BLD: 3.64 M/UL
RBC # FLD: 17.8 %
SODIUM SERPL-SCNC: 142 MMOL/L
SPECIFIC GRAVITY URINE: 1.02
T4 FREE SERPL-MCNC: 1.4 NG/DL
TRIGL SERPL-MCNC: 54 MG/DL
TSH SERPL-ACNC: 2.45 UIU/ML
UROBILINOGEN URINE: 0.2 MG/DL
VIT B12 SERPL-MCNC: 627 PG/ML
WBC # FLD AUTO: 8.57 K/UL

## 2024-01-10 LAB
FERRITIN SERPL-MCNC: 230 NG/ML
IRON SATN MFR SERPL: 16 %
IRON SERPL-MCNC: 49 UG/DL
TIBC SERPL-MCNC: 313 UG/DL
TRANSFERRIN SERPL-MCNC: 229 MG/DL
UIBC SERPL-MCNC: 264 UG/DL

## 2024-01-15 ENCOUNTER — OUTPATIENT (OUTPATIENT)
Dept: OUTPATIENT SERVICES | Facility: HOSPITAL | Age: 69
LOS: 1 days | End: 2024-01-15
Payer: MEDICARE

## 2024-01-15 ENCOUNTER — APPOINTMENT (OUTPATIENT)
Dept: CT IMAGING | Facility: CLINIC | Age: 69
End: 2024-01-15
Payer: MEDICARE

## 2024-01-15 DIAGNOSIS — Z87.891 PERSONAL HISTORY OF NICOTINE DEPENDENCE: ICD-10-CM

## 2024-01-15 PROCEDURE — 71271 CT THORAX LUNG CANCER SCR C-: CPT

## 2024-01-15 PROCEDURE — 71271 CT THORAX LUNG CANCER SCR C-: CPT | Mod: 26

## 2024-01-19 ENCOUNTER — APPOINTMENT (OUTPATIENT)
Dept: PULMONOLOGY | Facility: CLINIC | Age: 69
End: 2024-01-19
Payer: MEDICARE

## 2024-01-19 VITALS — HEART RATE: 79 BPM | OXYGEN SATURATION: 97 % | RESPIRATION RATE: 16 BRPM

## 2024-01-19 VITALS — WEIGHT: 208 LBS | BODY MASS INDEX: 35.7 KG/M2

## 2024-01-19 DIAGNOSIS — R91.8 OTHER NONSPECIFIC ABNORMAL FINDING OF LUNG FIELD: ICD-10-CM

## 2024-01-19 DIAGNOSIS — J43.2 CENTRILOBULAR EMPHYSEMA: ICD-10-CM

## 2024-01-19 PROCEDURE — 99204 OFFICE O/P NEW MOD 45 MIN: CPT | Mod: 25

## 2024-01-19 PROCEDURE — 94010 BREATHING CAPACITY TEST: CPT

## 2024-01-19 PROCEDURE — G0296 VISIT TO DETERM LDCT ELIG: CPT

## 2024-01-19 RX ORDER — FLUTICASONE FUROATE, UMECLIDINIUM BROMIDE AND VILANTEROL TRIFENATATE 200; 62.5; 25 UG/1; UG/1; UG/1
200-62.5-25 POWDER RESPIRATORY (INHALATION)
Qty: 1 | Refills: 5 | Status: ACTIVE | COMMUNITY
Start: 2024-01-19 | End: 1900-01-01

## 2024-01-19 NOTE — COUNSELING
[FreeTextEntry2] : Former smoker [ - Annual Lung Cancer Screening/Share Decision Making Discussion] : Annual Lung Cancer Screening/Share Decision Making Discussion. (I have advised this patient to have a Low Dose CT (LDCT) scan of the lungs and have discussed the following with the patient in a shared decision making discussion:   Benefits of Detection and Early Treatment: There is adequate evidence that annual screening for lung cancer with LDCT in a population of high-risk persons can prevent a substantial number of lung cancer-related deaths. The magnitude of benefit depends on the individual patient's risk for lung cancer, as those who are at highest risk are most likely to benefit. Screening cannot prevent most lung cancer-related deaths, and does not replace smoking cessation. Harms of Detection and Early Intervention and Treatment: The harms associated with LDCT screening include false-negative and false-positive results, incidental findings, over diagnosis, and radiation exposure. False-positive LDCT results occur in a substantial proportion of screened persons; 95% of all positive results do not lead to a diagnosis of cancer. In a high-quality screening program, further imaging can resolve most false-positive results; however, some patients may require invasive procedures. Radiation harms, including cancer resulting from cumulative exposure to radiation, vary depending on the age at the start of screening; the number of scans received; and the person's exposure to other sources of radiation, particularly other medical imaging.)

## 2024-01-19 NOTE — DISCUSSION/SUMMARY
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  Findings on CAT scan consistent with centrilobular emphysema mild with a moderate degree of airways obstruction versus chest wall restriction from abdominal obesity.  Course complicated by chronic sinusitis with postnasal drip and intermittent cough.  Additional findings on CAT scan include atelectasis/infiltrates at the left base.  These most likely residual from prior inflammation.  No symptoms consistent with obstructive sleep apnea  Plan: 1.  Trial of Trelegy to maximize bronchodilatation and evaluate whether there is an improvement in airflows 2.  Follow-up with full pulmonary function test including diffusion capacity 3.  Repeat chest CT in 6 months

## 2024-01-19 NOTE — RESULTS/DATA
[TextEntry] : 1/15/2024: [facility] imaging low-dose chest CT-mild emphysematous changes.  No discrete masses.  Infiltrate or atelectasis at the left base both medial and lateral.  No discrete adenopathy.  (Official report pending.  Films reviewed with patient)

## 2024-01-19 NOTE — HISTORY OF PRESENT ILLNESS
[Former] : former [>= 20 pack years] : >= 20 pack years [Snoring] : snoring [TextBox_4] : 68-year-old female with a history of hypertension, diabetes, hypercholesterolemia, nasal polyps and former smoker.  Patient seen today for pulmonary evaluation and to establish care.  Patient has complaints of intermittent cough which she usually located the base of her neck and produce clear to mildly opaque sputum.  She does exercise on a regular basis walking about a mile.  She does complain of mild dyspnea after climbing a flight of stairs with laundry.  There is no history of wheezing, chest pains, palpitations, lightheadedness, dizziness.  She did complete a cardiac workup last year which was unremarkable.  Patient is a retired nurse.  [TextBox_11] : 1 [TextBox_13] : 30 [YearQuit] : 2010 [Awakes Unrefreshed] : does not awaken unrefreshed [Awakes with Dry Mouth] : does not awaken with dry mouth [Awakes with Headache] : does not awaken with headache [Witnessed Apneas] : no witnessed apneas [TextBox_77] : 6272 [TextBox_79] : 9859 [TextBox_81] : 30 [TextBox_85] : 7 [TextBox_89] : 0 [ESS] : 3

## 2024-01-22 DIAGNOSIS — N28.9 DISORDER OF KIDNEY AND URETER, UNSPECIFIED: ICD-10-CM

## 2024-01-26 ENCOUNTER — OUTPATIENT (OUTPATIENT)
Dept: OUTPATIENT SERVICES | Facility: HOSPITAL | Age: 69
LOS: 1 days | End: 2024-01-26
Payer: MEDICARE

## 2024-01-26 ENCOUNTER — APPOINTMENT (OUTPATIENT)
Dept: ULTRASOUND IMAGING | Facility: CLINIC | Age: 69
End: 2024-01-26

## 2024-01-26 DIAGNOSIS — N28.9 DISORDER OF KIDNEY AND URETER, UNSPECIFIED: ICD-10-CM

## 2024-01-26 PROCEDURE — 76775 US EXAM ABDO BACK WALL LIM: CPT | Mod: 26

## 2024-01-29 ENCOUNTER — RX RENEWAL (OUTPATIENT)
Age: 69
End: 2024-01-29

## 2024-01-29 RX ORDER — ROSUVASTATIN CALCIUM 10 MG/1
10 TABLET, FILM COATED ORAL DAILY
Qty: 90 | Refills: 1 | Status: ACTIVE | COMMUNITY
Start: 2023-03-22 | End: 1900-01-01

## 2024-01-30 ENCOUNTER — NON-APPOINTMENT (OUTPATIENT)
Age: 69
End: 2024-01-30

## 2024-03-05 ENCOUNTER — APPOINTMENT (OUTPATIENT)
Dept: PULMONOLOGY | Facility: CLINIC | Age: 69
End: 2024-03-05

## 2024-03-11 ENCOUNTER — RX RENEWAL (OUTPATIENT)
Age: 69
End: 2024-03-11

## 2024-03-14 ENCOUNTER — APPOINTMENT (OUTPATIENT)
Dept: OBGYN | Facility: CLINIC | Age: 69
End: 2024-03-14
Payer: MEDICARE

## 2024-03-14 ENCOUNTER — APPOINTMENT (OUTPATIENT)
Dept: INTERNAL MEDICINE | Facility: CLINIC | Age: 69
End: 2024-03-14
Payer: MEDICARE

## 2024-03-14 VITALS
HEIGHT: 64 IN | SYSTOLIC BLOOD PRESSURE: 112 MMHG | HEART RATE: 75 BPM | RESPIRATION RATE: 14 BRPM | WEIGHT: 209 LBS | DIASTOLIC BLOOD PRESSURE: 60 MMHG | BODY MASS INDEX: 35.68 KG/M2

## 2024-03-14 VITALS
HEIGHT: 64 IN | SYSTOLIC BLOOD PRESSURE: 112 MMHG | OXYGEN SATURATION: 97 % | HEART RATE: 75 BPM | WEIGHT: 206 LBS | RESPIRATION RATE: 15 BRPM | TEMPERATURE: 97.7 F | BODY MASS INDEX: 35.17 KG/M2 | DIASTOLIC BLOOD PRESSURE: 60 MMHG

## 2024-03-14 DIAGNOSIS — Z00.00 ENCOUNTER FOR GENERAL ADULT MEDICAL EXAMINATION W/OUT ABNORMAL FINDINGS: ICD-10-CM

## 2024-03-14 DIAGNOSIS — N28.1 CYST OF KIDNEY, ACQUIRED: ICD-10-CM

## 2024-03-14 DIAGNOSIS — R05.3 CHRONIC COUGH: ICD-10-CM

## 2024-03-14 DIAGNOSIS — Z12.4 ENCOUNTER FOR SCREENING FOR MALIGNANT NEOPLASM OF CERVIX: ICD-10-CM

## 2024-03-14 DIAGNOSIS — Z12.39 ENCOUNTER FOR OTHER SCREENING FOR MALIGNANT NEOPLASM OF BREAST: ICD-10-CM

## 2024-03-14 DIAGNOSIS — R00.2 PALPITATIONS: ICD-10-CM

## 2024-03-14 DIAGNOSIS — D64.9 ANEMIA, UNSPECIFIED: ICD-10-CM

## 2024-03-14 PROCEDURE — G2211 COMPLEX E/M VISIT ADD ON: CPT

## 2024-03-14 PROCEDURE — 36415 COLL VENOUS BLD VENIPUNCTURE: CPT

## 2024-03-14 PROCEDURE — G0101: CPT

## 2024-03-14 PROCEDURE — 99214 OFFICE O/P EST MOD 30 MIN: CPT

## 2024-03-14 RX ORDER — METFORMIN HYDROCHLORIDE 1000 MG/1
1000 TABLET, COATED ORAL
Qty: 180 | Refills: 0 | Status: ACTIVE | COMMUNITY
Start: 2023-04-26 | End: 1900-01-01

## 2024-03-14 RX ORDER — ORAL SEMAGLUTIDE 14 MG/1
TABLET ORAL
Refills: 0 | Status: ACTIVE | COMMUNITY

## 2024-03-14 RX ORDER — ORAL SEMAGLUTIDE 7 MG/1
7 TABLET ORAL
Refills: 0 | Status: DISCONTINUED | COMMUNITY
End: 2024-03-14

## 2024-03-14 RX ORDER — PIOGLITAZONE 45 MG/1
TABLET ORAL
Refills: 0 | Status: ACTIVE | COMMUNITY

## 2024-03-14 RX ORDER — NEBIVOLOL 2.5 MG/1
2.5 TABLET ORAL DAILY
Qty: 90 | Refills: 1 | Status: DISCONTINUED | COMMUNITY
Start: 2023-02-07 | End: 2024-03-14

## 2024-03-14 RX ORDER — IRBESARTAN 300 MG/1
300 TABLET ORAL DAILY
Qty: 90 | Refills: 0 | Status: ACTIVE | COMMUNITY
Start: 1900-01-01 | End: 1900-01-01

## 2024-03-14 RX ORDER — PIOGLITAZONE HYDROCHLORIDE 30 MG/1
30 TABLET ORAL DAILY
Qty: 90 | Refills: 0 | Status: DISCONTINUED | COMMUNITY
Start: 2023-03-15 | End: 2024-03-14

## 2024-03-14 NOTE — DISCUSSION/SUMMARY
[FreeTextEntry1] : 1) pap performed 2) rx for screening mammo issued  Return to office in one year, sooner prn

## 2024-03-14 NOTE — ASSESSMENT
[FreeTextEntry1] : Physical Exam: Constitutional: No acute distress, well appearing HEENT: Normocephalic, atraumatic Neck: supple Cardiac: Regular rate and rhythm, No murmurs Pulmonary: No respiratory distress, Lungs clear to auscultation bilaterally, no wheezing, rales, or rhonchi Abdomen: Soft, non-tender, non-distended, no guarding, normal bowel sounds Vascular: No peripheral edema Neurology: Coordination grossly intact, no focal deficits Psychiatric: Alert and oriented x3, normal mood      A/P: anemia states she has long hx of iron def anemia states she has had fibroids removed slightly anemic on labs from jan vitmain b12 and FA and iron levels are wnl states she had pap earlier today, and has appt with GI for screening colonoscopy states she has had chronic anemia for 4-5 years. states it is usual for her hb to be in the 10s. her mother also had chronic anemia.  - declined hematology eval - will continue to monitor, will repeat today for trend- bw drawn in office   DM;  on metformin 1000mg BID, pioglitazone 30 once dailly. states she is on rybelsus 7mg for 1 year last a1c 6.6 in jan states she is no longer experiencing coverage issues with rybelsus and will c/w it. states she had injections x 4 in the last 2 months- from her podiatrist as well as her orthopedic - will check a1c today - rec low carb diet, weight loss - rec to avoid frequent steroid injections as can cause progression of her DM - f/u 3 months or sooner as needed  HTN bp controlled - c/w irbesartan 300mg daily  hip pain: diagnosed with hip bursitis which she has been getting PT for. states she recently received injection by ortho - c/w PT - f/u ortho  palpitations resolved had neg cardiac workup including echo and stress testing and holter last saw her cardiologist Dr Valerio in may 2023- was rec annual f/u she had stopped nebivolol on her own bc leg cramps- did not wish to back on it HR remains normal, pt asymptomatic - advised annual f/u with cardiology  HLD lipids reviewed from jan. at goal - c/w  rosuvastatin 10, fish oil  foot OA states recently received steroid injection, follows with podiatry  nasal polyp: on singular 10 every other day and flonase prn  chronic cough former long time smoker CT chest showed 2mm noncalcified nodule on RLL on CT lung screening.   saw pulm Dr Cosby in jan. on review of his note, CT consistent with centrilobular emphysema mild with a moderate degree of airways obstruction versus chest wall restriction from abdominal obesity. was recommended trial of trellegy ellipta and then f/u PFTs and repeat CT chest in 6 months states trellegy copay was too expensive, she did pick it up and helped with her cough - recommended she f/u with pulm for alternative tx   renal cyst  4.5cm renal cyst - was advised to f/u with urology - will repeat in 1 year for monitoring or sooner as indicated by urology  HCM: - Colon CA screening- due, states she is seeing GI later this month - cervical CA screening- due, states she saw gyn earlier today

## 2024-03-14 NOTE — HISTORY OF PRESENT ILLNESS
[FreeTextEntry1] : f/u [de-identified] : AIDA ARORA is a 68 year old female with PMHx anemia, DM, HTN presenting for f/u.

## 2024-03-14 NOTE — PHYSICAL EXAM
[Appropriately responsive] : appropriately responsive [No Acute Distress] : no acute distress [Alert] : alert [Oriented x3] : oriented x3 [No Lymphadenopathy] : no lymphadenopathy [Examination Of The Breasts] : a normal appearance [No Discharge] : no discharge [No Masses] : no breast masses were palpable [Labia Majora] : normal [Labia Minora] : normal [Atrophy] : atrophy [Uterine Adnexae] : normal [Normal] : normal

## 2024-03-17 LAB — HPV HIGH+LOW RISK DNA PNL CVX: NOT DETECTED

## 2024-03-18 LAB — CYTOLOGY CVX/VAG DOC THIN PREP: ABNORMAL

## 2024-03-20 LAB
ALBUMIN SERPL ELPH-MCNC: 4.6 G/DL
ALP BLD-CCNC: 63 U/L
ALT SERPL-CCNC: 13 U/L
ANION GAP SERPL CALC-SCNC: 13 MMOL/L
APPEARANCE: CLEAR
AST SERPL-CCNC: 12 U/L
BASOPHILS # BLD AUTO: 0.07 K/UL
BASOPHILS NFR BLD AUTO: 0.9 %
BILIRUB SERPL-MCNC: 0.4 MG/DL
BILIRUBIN URINE: NEGATIVE
BLOOD URINE: NEGATIVE
BUN SERPL-MCNC: 21 MG/DL
CALCIUM SERPL-MCNC: 9.9 MG/DL
CHLORIDE SERPL-SCNC: 100 MMOL/L
CO2 SERPL-SCNC: 25 MMOL/L
COLOR: YELLOW
CREAT SERPL-MCNC: 0.75 MG/DL
CREAT SPEC-SCNC: 50 MG/DL
EGFR: 86 ML/MIN/1.73M2
EOSINOPHIL # BLD AUTO: 0.13 K/UL
EOSINOPHIL NFR BLD AUTO: 1.6 %
ESTIMATED AVERAGE GLUCOSE: 137 MG/DL
GLUCOSE QUALITATIVE U: NEGATIVE MG/DL
GLUCOSE SERPL-MCNC: 87 MG/DL
HBA1C MFR BLD HPLC: 6.4 %
HCT VFR BLD CALC: 35.2 %
HGB BLD-MCNC: 11.2 G/DL
IMM GRANULOCYTES NFR BLD AUTO: 0.2 %
KETONES URINE: NEGATIVE MG/DL
LEUKOCYTE ESTERASE URINE: NEGATIVE
LYMPHOCYTES # BLD AUTO: 2.57 K/UL
LYMPHOCYTES NFR BLD AUTO: 31.3 %
MAN DIFF?: NORMAL
MCHC RBC-ENTMCNC: 30.5 PG
MCHC RBC-ENTMCNC: 31.8 GM/DL
MCV RBC AUTO: 95.9 FL
MICROALBUMIN 24H UR DL<=1MG/L-MCNC: <1.2 MG/DL
MICROALBUMIN/CREAT 24H UR-RTO: NORMAL MG/G
MONOCYTES # BLD AUTO: 0.53 K/UL
MONOCYTES NFR BLD AUTO: 6.4 %
NEUTROPHILS # BLD AUTO: 4.9 K/UL
NEUTROPHILS NFR BLD AUTO: 59.6 %
NITRITE URINE: NEGATIVE
PH URINE: 5.5
PLATELET # BLD AUTO: 425 K/UL
POTASSIUM SERPL-SCNC: 4.9 MMOL/L
PROT SERPL-MCNC: 6.8 G/DL
PROTEIN URINE: NEGATIVE MG/DL
RBC # BLD: 3.67 M/UL
RBC # FLD: 17.2 %
SODIUM SERPL-SCNC: 138 MMOL/L
SPECIFIC GRAVITY URINE: 1.02
UROBILINOGEN URINE: 0.2 MG/DL
WBC # FLD AUTO: 8.22 K/UL

## 2024-03-28 ENCOUNTER — APPOINTMENT (OUTPATIENT)
Age: 69
End: 2024-03-28
Payer: MEDICARE

## 2024-03-28 VITALS
HEIGHT: 64 IN | BODY MASS INDEX: 35.17 KG/M2 | WEIGHT: 206 LBS | SYSTOLIC BLOOD PRESSURE: 130 MMHG | DIASTOLIC BLOOD PRESSURE: 70 MMHG

## 2024-03-28 DIAGNOSIS — Z80.0 ENCOUNTER FOR SCREENING FOR MALIGNANT NEOPLASM OF COLON: ICD-10-CM

## 2024-03-28 DIAGNOSIS — Z12.11 ENCOUNTER FOR SCREENING FOR MALIGNANT NEOPLASM OF COLON: ICD-10-CM

## 2024-03-28 DIAGNOSIS — R19.4 CHANGE IN BOWEL HABIT: ICD-10-CM

## 2024-03-28 PROCEDURE — 99203 OFFICE O/P NEW LOW 30 MIN: CPT

## 2024-03-28 RX ORDER — SODIUM SULFATE, POTASSIUM SULFATE AND MAGNESIUM SULFATE 1.6; 3.13; 17.5 G/177ML; G/177ML; G/177ML
17.5-3.13-1.6 SOLUTION ORAL
Qty: 1 | Refills: 0 | Status: ACTIVE | COMMUNITY
Start: 2024-03-28 | End: 1900-01-01

## 2024-03-28 NOTE — ASSESSMENT
[FreeTextEntry1] : 69-year-old female presenting for repeat colonoscopy, and recent change in bowel habits.   Plan:  Pt had two adenomas measuring greater than 1 cm in 2021, recently has a change in bowel habits, and strong history of CRC, recommend repeat colonoscopy.  Risks versus benefits as well as instructions given. Pt instructed metformin will need to be held prior to procedure, PCP to be made aware by patient. Pt agrees to planned procedure. Suprep sent for preparation. All questions answered. I spent 30 minutes on this encounter.

## 2024-03-28 NOTE — REVIEW OF SYSTEMS
[As Noted in HPI] : as noted in HPI [Abdominal Pain] : no abdominal pain [Vomiting] : no vomiting [Constipation] : constipation [Heartburn] : no heartburn [Diarrhea] : no diarrhea [Melena (black stool)] : no melena [Bleeding] : no bleeding [Fecal Incontinence (soiling)] : no fecal incontinence [Bloating (gassiness)] : no bloating [Negative] : Heme/Lymph

## 2024-03-28 NOTE — PHYSICAL EXAM
[Alert] : alert [Normal Voice/Communication] : normal voice/communication [Healthy Appearing] : healthy appearing [Sclera] : the sclera and conjunctiva were normal [Hearing Threshold Finger Rub Not [place]] : hearing was normal [Normal Lips/Gums] : the lips and gums were normal [No Respiratory Distress] : no respiratory distress [Normal Appearance] : the appearance of the neck was normal [Auscultation Breath Sounds / Voice Sounds] : lungs were clear to auscultation bilaterally [Heart Rate And Rhythm] : heart rate was normal and rhythm regular [Normal S1, S2] : normal S1 and S2 [Bowel Sounds] : normal bowel sounds [Abdomen Tenderness] : non-tender [Abdomen Soft] : soft [Normal Color / Pigmentation] : normal skin color and pigmentation [Abnormal Walk] : normal gait [Oriented To Time, Place, And Person] : oriented to person, place, and time

## 2024-03-28 NOTE — HISTORY OF PRESENT ILLNESS
[FreeTextEntry1] : Arabella Foster is a 69-year-old female with PMHx of Anemia, HLD, DM type II on Metformin, Osteoarthritis, and colon polyps presents to the office today for repeat colonoscopy. Last colonoscopy in 2021 with Dr. Williamson, two adenomas measuring greater than 1cm removed. Pt reports recent change in bowel habits, producing smaller more frequent stools. Positive family history of CRC.   Denies significant straining or overt bleeding such as melena or hematochezia. Denies upper GI symptoms such as GERD, nausea, vomiting, or dysphagia. Denies unintentional weight loss.

## 2024-04-09 RX ORDER — ORAL SEMAGLUTIDE 7 MG/1
7 TABLET ORAL
Qty: 90 | Refills: 0 | Status: ACTIVE | COMMUNITY
Start: 2024-04-09 | End: 1900-01-01

## 2024-04-30 ENCOUNTER — RESULT REVIEW (OUTPATIENT)
Age: 69
End: 2024-04-30

## 2024-04-30 ENCOUNTER — APPOINTMENT (OUTPATIENT)
Dept: GASTROENTEROLOGY | Facility: AMBULATORY MEDICAL SERVICES | Age: 69
End: 2024-04-30
Payer: MEDICARE

## 2024-04-30 PROCEDURE — 45385 COLONOSCOPY W/LESION REMOVAL: CPT

## 2024-06-27 ENCOUNTER — APPOINTMENT (OUTPATIENT)
Dept: CARDIOLOGY | Facility: CLINIC | Age: 69
End: 2024-06-27
Payer: MEDICARE

## 2024-06-27 ENCOUNTER — NON-APPOINTMENT (OUTPATIENT)
Age: 69
End: 2024-06-27

## 2024-06-27 VITALS
BODY MASS INDEX: 34.66 KG/M2 | OXYGEN SATURATION: 97 % | HEART RATE: 102 BPM | HEIGHT: 64 IN | DIASTOLIC BLOOD PRESSURE: 68 MMHG | RESPIRATION RATE: 16 BRPM | WEIGHT: 203 LBS | SYSTOLIC BLOOD PRESSURE: 108 MMHG

## 2024-06-27 DIAGNOSIS — E11.9 TYPE 2 DIABETES MELLITUS W/OUT COMPLICATIONS: ICD-10-CM

## 2024-06-27 DIAGNOSIS — E78.5 HYPERLIPIDEMIA, UNSPECIFIED: ICD-10-CM

## 2024-06-27 DIAGNOSIS — I10 ESSENTIAL (PRIMARY) HYPERTENSION: ICD-10-CM

## 2024-06-27 DIAGNOSIS — I87.2 VENOUS INSUFFICIENCY (CHRONIC) (PERIPHERAL): ICD-10-CM

## 2024-06-27 DIAGNOSIS — E11.69 TYPE 2 DIABETES MELLITUS WITH OTHER SPECIFIED COMPLICATION: ICD-10-CM

## 2024-06-27 DIAGNOSIS — E66.9 OBESITY, UNSPECIFIED: ICD-10-CM

## 2024-06-27 PROCEDURE — 93000 ELECTROCARDIOGRAM COMPLETE: CPT

## 2024-06-27 PROCEDURE — G2211 COMPLEX E/M VISIT ADD ON: CPT

## 2024-06-27 PROCEDURE — 99214 OFFICE O/P EST MOD 30 MIN: CPT

## 2024-08-08 ENCOUNTER — APPOINTMENT (OUTPATIENT)
Dept: PULMONOLOGY | Facility: CLINIC | Age: 69
End: 2024-08-08

## 2024-08-08 PROBLEM — J44.9 CHRONIC OBSTRUCTIVE PULMONARY DISEASE, UNSPECIFIED COPD TYPE: Status: ACTIVE | Noted: 2024-08-08

## 2024-08-08 PROBLEM — J43.9 PULMONARY EMPHYSEMA, UNSPECIFIED EMPHYSEMA TYPE: Status: ACTIVE | Noted: 2024-08-08

## 2024-08-08 PROCEDURE — 99214 OFFICE O/P EST MOD 30 MIN: CPT

## 2024-08-08 PROCEDURE — G2211 COMPLEX E/M VISIT ADD ON: CPT

## 2024-08-08 NOTE — PHYSICAL EXAM
[No Acute Distress] : no acute distress [Normal Appearance] : normal appearance [Normal Rate/Rhythm] : normal rate/rhythm [Normal S1, S2] : normal s1, s2 [No Murmurs] : no murmurs [No Rubs] : no rubs [No Gallops] : no gallops [No Resp Distress] : no resp distress [No Acc Muscle Use] : no acc muscle use [Normal Rhythm and Effort] : normal rhythm and effort [Normal to Percussion] : normal to percussion [No Abnormalities] : no abnormalities [Normal Gait] : normal gait [No Clubbing] : no clubbing [No Cyanosis] : no cyanosis [No Edema] : no edema [FROM] : FROM [Normal Color/ Pigmentation] : normal color/ pigmentation [No Focal Deficits] : no focal deficits [Oriented x3] : oriented x3 [Normal Affect] : normal affect [TextBox_68] : mod air entry with intermittent rhonchi

## 2024-08-08 NOTE — HISTORY OF PRESENT ILLNESS
[Former] : former [>= 20 pack years] : >= 20 pack years [TextBox_4] : smoke free intermittent wheezing no cp some intermittent sputum benefitted from trelegy, but ran out cost was $$$ no fever, chill, chest pain no purulent sputum [YearQuit] : 2011

## 2024-08-08 NOTE — CONSULT LETTER
[Dear  ___] : Dear  [unfilled], [Consult Letter:] : I had the pleasure of evaluating your patient, [unfilled]. [Please see my note below.] : Please see my note below. [Sincerely,] : Sincerely, [FreeTextEntry3] : Dust[facility] Hawk [facility] Director Pulmonary Division Medical Director Respiratory Therapy [facility]

## 2024-08-08 NOTE — PROCEDURE
[FreeTextEntry1] : CT scan 1/24 - minute nodule 2 mm spirometry 1/24 combined restrictive and obstructive impairment

## 2024-08-08 NOTE — CONSULT LETTER
[Dear  ___] : Dear  [unfilled], [Consult Letter:] : I had the pleasure of evaluating your patient, [unfilled]. [Please see my note below.] : Please see my note below. [Sincerely,] : Sincerely, [FreeTextEntry3] : Dust[facility] Hawk DO [facility] Director Pul[facility] Division Medical Director Respiratory Therapy [facility]

## 2024-10-18 ENCOUNTER — RESULT REVIEW (OUTPATIENT)
Age: 69
End: 2024-10-18

## 2024-10-18 ENCOUNTER — APPOINTMENT (OUTPATIENT)
Dept: MAMMOGRAPHY | Facility: CLINIC | Age: 69
End: 2024-10-18
Payer: MEDICARE

## 2024-10-18 ENCOUNTER — OUTPATIENT (OUTPATIENT)
Dept: OUTPATIENT SERVICES | Facility: HOSPITAL | Age: 69
LOS: 1 days | End: 2024-10-18
Payer: MEDICARE

## 2024-10-18 DIAGNOSIS — Z12.31 ENCOUNTER FOR SCREENING MAMMOGRAM FOR MALIGNANT NEOPLASM OF BREAST: ICD-10-CM

## 2024-10-18 PROCEDURE — 77067 SCR MAMMO BI INCL CAD: CPT | Mod: 26

## 2024-10-18 PROCEDURE — 77063 BREAST TOMOSYNTHESIS BI: CPT | Mod: 26

## 2024-11-15 ENCOUNTER — RX RENEWAL (OUTPATIENT)
Age: 69
End: 2024-11-15

## 2024-11-20 PROCEDURE — 77067 SCR MAMMO BI INCL CAD: CPT

## 2024-11-20 PROCEDURE — 77063 BREAST TOMOSYNTHESIS BI: CPT

## 2024-12-13 ENCOUNTER — APPOINTMENT (OUTPATIENT)
Dept: PULMONOLOGY | Facility: CLINIC | Age: 69
End: 2024-12-13
Payer: MEDICARE

## 2024-12-13 VITALS
DIASTOLIC BLOOD PRESSURE: 77 MMHG | WEIGHT: 205 LBS | BODY MASS INDEX: 35 KG/M2 | SYSTOLIC BLOOD PRESSURE: 129 MMHG | HEIGHT: 64 IN | HEART RATE: 91 BPM | RESPIRATION RATE: 16 BRPM | OXYGEN SATURATION: 98 %

## 2024-12-13 DIAGNOSIS — J43.9 EMPHYSEMA, UNSPECIFIED: ICD-10-CM

## 2024-12-13 DIAGNOSIS — R91.1 SOLITARY PULMONARY NODULE: ICD-10-CM

## 2024-12-13 DIAGNOSIS — J44.9 CHRONIC OBSTRUCTIVE PULMONARY DISEASE, UNSPECIFIED: ICD-10-CM

## 2024-12-13 PROCEDURE — G2211 COMPLEX E/M VISIT ADD ON: CPT

## 2024-12-13 PROCEDURE — 99213 OFFICE O/P EST LOW 20 MIN: CPT

## 2025-01-02 ENCOUNTER — RX RENEWAL (OUTPATIENT)
Age: 70
End: 2025-01-02

## 2025-01-22 ENCOUNTER — NON-APPOINTMENT (OUTPATIENT)
Age: 70
End: 2025-01-22

## 2025-01-22 VITALS — WEIGHT: 205 LBS | BODY MASS INDEX: 35 KG/M2 | HEIGHT: 64 IN

## 2025-01-22 DIAGNOSIS — Z87.891 PERSONAL HISTORY OF NICOTINE DEPENDENCE: ICD-10-CM

## 2025-01-31 ENCOUNTER — APPOINTMENT (OUTPATIENT)
Dept: CT IMAGING | Facility: CLINIC | Age: 70
End: 2025-01-31

## 2025-01-31 ENCOUNTER — OUTPATIENT (OUTPATIENT)
Dept: OUTPATIENT SERVICES | Facility: HOSPITAL | Age: 70
LOS: 1 days | End: 2025-01-31
Payer: MEDICARE

## 2025-01-31 DIAGNOSIS — Z87.891 PERSONAL HISTORY OF NICOTINE DEPENDENCE: ICD-10-CM

## 2025-01-31 PROCEDURE — 71271 CT THORAX LUNG CANCER SCR C-: CPT

## 2025-01-31 PROCEDURE — 71271 CT THORAX LUNG CANCER SCR C-: CPT | Mod: 26

## 2025-04-22 ENCOUNTER — NON-APPOINTMENT (OUTPATIENT)
Age: 70
End: 2025-04-22

## 2025-04-23 ENCOUNTER — APPOINTMENT (OUTPATIENT)
Dept: PULMONOLOGY | Facility: CLINIC | Age: 70
End: 2025-04-23
Payer: MEDICARE

## 2025-04-23 VITALS
OXYGEN SATURATION: 98 % | HEART RATE: 84 BPM | SYSTOLIC BLOOD PRESSURE: 130 MMHG | DIASTOLIC BLOOD PRESSURE: 70 MMHG | RESPIRATION RATE: 16 BRPM

## 2025-04-23 VITALS — WEIGHT: 205 LBS | BODY MASS INDEX: 36.32 KG/M2 | HEIGHT: 63 IN

## 2025-04-23 DIAGNOSIS — J43.2 CENTRILOBULAR EMPHYSEMA: ICD-10-CM

## 2025-04-23 DIAGNOSIS — J44.9 CHRONIC OBSTRUCTIVE PULMONARY DISEASE, UNSPECIFIED: ICD-10-CM

## 2025-04-23 PROCEDURE — 99213 OFFICE O/P EST LOW 20 MIN: CPT | Mod: 25

## 2025-04-23 PROCEDURE — 94010 BREATHING CAPACITY TEST: CPT

## 2025-07-14 ENCOUNTER — RX RENEWAL (OUTPATIENT)
Age: 70
End: 2025-07-14

## 2025-08-11 ENCOUNTER — RX RENEWAL (OUTPATIENT)
Age: 70
End: 2025-08-11

## 2025-09-09 ENCOUNTER — APPOINTMENT (OUTPATIENT)
Dept: CARDIOLOGY | Facility: CLINIC | Age: 70
End: 2025-09-09
Payer: MEDICARE

## 2025-09-09 VITALS
SYSTOLIC BLOOD PRESSURE: 126 MMHG | DIASTOLIC BLOOD PRESSURE: 70 MMHG | WEIGHT: 203 LBS | HEART RATE: 80 BPM | HEIGHT: 63 IN | BODY MASS INDEX: 35.97 KG/M2

## 2025-09-09 DIAGNOSIS — E11.69 TYPE 2 DIABETES MELLITUS WITH OTHER SPECIFIED COMPLICATION: ICD-10-CM

## 2025-09-09 DIAGNOSIS — R00.2 PALPITATIONS: ICD-10-CM

## 2025-09-09 DIAGNOSIS — I10 ESSENTIAL (PRIMARY) HYPERTENSION: ICD-10-CM

## 2025-09-09 DIAGNOSIS — E78.5 HYPERLIPIDEMIA, UNSPECIFIED: ICD-10-CM

## 2025-09-09 PROCEDURE — 99214 OFFICE O/P EST MOD 30 MIN: CPT

## 2025-09-09 PROCEDURE — G2211 COMPLEX E/M VISIT ADD ON: CPT

## 2025-09-09 PROCEDURE — 93000 ELECTROCARDIOGRAM COMPLETE: CPT
